# Patient Record
Sex: MALE | Race: WHITE | Employment: OTHER | URBAN - METROPOLITAN AREA
[De-identification: names, ages, dates, MRNs, and addresses within clinical notes are randomized per-mention and may not be internally consistent; named-entity substitution may affect disease eponyms.]

---

## 2017-05-02 ENCOUNTER — TRANSFERRED RECORDS (OUTPATIENT)
Dept: HEALTH INFORMATION MANAGEMENT | Facility: CLINIC | Age: 82
End: 2017-05-02

## 2017-05-04 ENCOUNTER — THERAPY VISIT (OUTPATIENT)
Dept: SPEECH THERAPY | Facility: CLINIC | Age: 82
End: 2017-05-04

## 2017-05-04 DIAGNOSIS — R49.1 APHONIA: Primary | ICD-10-CM

## 2017-05-04 DIAGNOSIS — C32.9 LARYNGEAL CANCER (H): ICD-10-CM

## 2017-05-04 NOTE — PROGRESS NOTES
ALARYNGEAL COMMUNICATION EVALUATION       05/04/17 1300   General Patient Information   Start of Care Date 05/04/17   Referring Physician Dr. Patrick Archuleta   Orders Eval and Treat   Orders Comment TEP eval   Orders Date 05/04/17   Medical Diagnosis Laryngeal cancer s/p total laryngectomy   Onset of illness/injury or Date of Surgery 04/17/12   Special Instructions TEP assessment   Surgical/Medical history reviewed Yes   Pertinent history of current problem PT's PMH is significant for total laryngectomy on 2/28/12 with TEP placement 4/17/12. Pt had recurrent laryngeal cancer s/p XRT. Pt completed XRT for lung cancer in summer of 2015.  He underwent additional XRT for liver mets ending in late 2016.  Pt presents today with leakage through prosthesis central valve.   Hearing WNL for conversational level of speech   Vision Glasses/contacts   Dentition present   Literacy Skills WFL   Present Hydration/Nutrition Method Oral diet   Diet regular with thin liquids   Occupation retired   Living Status house in [place]   Primary Communication Method TEP   Previous Therapy TEP assessments   Evaluation Results   Behavioral Observations Mr. Ma was pleasant and cooperative for evaluation.   Communication Observations Mr. Ma is communicating without difficulty with the use of his TEP and HME.   Postoperative Assessment   Postoperative Assessment Tracheoesophageal Voice Prosthesis;Insertion/Replacement   Tracheoesophageal Voice Prosthesis   Tracheoesophageal Voice Prosthesis Current Prosthesis   Current Prosthesis Intact    Brand Inhealth   Type Indwelling   Length 8mm   Diameter 20 Danish   Date Of Placement 11/07/16   Examination Of Current Prosthesis Well fitting with leakage noted through central valve.   Voicing Function baseline   Swallowing Function aspiration through TEP   Antifungal Strategies nystatin   Reason For Replacement Valve Failure   Insertion/replacement   Insertion/replacement Given The Above  Observations, The Replacement Tracheoesophageal Voice Prosthesis Was Prepared For Insertion According To The  s Instructions.   Brand Inhealth   Type Indwelling   Length 8mm   Diameter 20 Pashto   Insertion Complications none   Voicing Function Following Replacement baseline   Swallowing Function Following Replacement baseline   Hme Brand hi flow; atos   Impressions And Recommendations   Overall Fas Score/level Of Impairment 7   Communication Diagnosis Functional TE voicing   Summary PT presents with a return to functional TE voicing and swallowing without aspiration after prosthesis replacement today.  There were no complications encountered during replacement.    Equipment   Equipment InHealth 20 Fr. 8mm   Plan   Plan for next session Follow up PRN for changes in voicing, swallowing or leakage noted through or around prosthesis.   Education   Learner Patient   Readiness Eager   Method Explanation;Demonstration   Response Demonstrates understanding   Therapy Certification   Certification date from 05/04/17   Certification date to 05/04/17   Medical Diagnosis Laryngeal CA   Other SLP Functional Limitation   Other SLP Functional Limitation: Current Status , Goal , Discharge -Disv Only-Modifier the same for all G-codes CM: 80-99% impairment   Other SLP Functional Limitation: Current & Discharge Modifier Rationale-Eval Only Modifier chosen based on the results of this evaluation, clinical judgement and reference to ERIN guidelines.   Other SLP Functional Limitation Comments Deficits resolved.    Total Evaluation Time   Total Evaluation Time 30

## 2017-05-04 NOTE — MR AVS SNAPSHOT
After Visit Summary   5/4/2017    Grabiel Ma    MRN: [id number]           Patient Information     Date Of Birth          1935        Visit Information        Provider Department      5/4/2017 1:00 PM Maria Luisa Streeter SLP [facility]        Today's Diagnoses     Aphonia    -  1    Laryngeal cancer (H)           Follow-ups after your visit        Additional Services     SPEECH THERAPY REFERRAL       *This therapy referral will be filtered to a centralized scheduling office at [facility] and the patient will receive a call to schedule an appointment at a [place] location most convenient for them. *     [facility] provides Speech Therapy evaluation and treatment and many specialty services across the [place] system.  If requesting a specialty program, please choose from the list below.  If you have not heard from the scheduling office within 2 business days, please call [phone] for all locations, with the exception of [place], please call [phone].       Treatment: Evaluation & Treatment  Speech Treatment Diagnosis: Aphonia  Special Instructions: TEP eval, does not need to be scheduled.   Special Programs: Tracheoesophageal voice prosthesis (TEP)  If the patient is unable to tolerate prosthesis replacement, 2% oral viscous Lidocaine may be administered at dose of 15 mL or 3% Lidocaine trans-stomal spray, 1-6 sprays of 0.1 cc spray       Please be aware that coverage of these services is subject to the terms and limitations of your health insurance plan.  Call member services at your health plan with any benefit or coverage questions.      **Note to Provider:  If you are referring outside of [place] for the therapy appointment, please list the name of the location in the  special instructions  above, print the referral and give to the patient to schedule the appointment.                  Who to contact     Please call your clinic at [phone]  to:    Ask questions about your health    Make or cancel appointments    Discuss your medicines    Learn about your test results    Speak to your doctor   If you have compliments or concerns about an experience at your clinic, or if you wish to file a complaint, please contact [facility] Patient Relations at [phone] or email us at [email or web address]         Additional Information About Your Visit        Coresonichart Information     iRhythm Technologiest is an electronic gateway that provides easy, online access to your medical records. With Mobile Tracing Services, you can request a clinic appointment, read your test results, renew a prescription or communicate with your care team.     To sign up for Mobile Tracing Services visit the website at [email or web address]   You will be asked to enter the access code listed below, as well as some personal information. Please follow the directions to create your username and password.     Your access code is: [email or web address]  Expires: 2017  6:30 AM     Your access code will  in 90 days. If you need help or a new code, please contact your [facility] Clinic or call [phone] for assistance.        Care EveryWhere ID     This is your Care EveryWhere ID. This could be used by other organizations to access your [place] medical records  [id number]         Blood Pressure from Last 3 Encounters:   12 139/69   12 141/83   12 100/48    Weight from Last 3 Encounters:   13 93.4 kg (205 lb 12.8 oz)   12 85 kg (187 lb 6.3 oz)   12 85.8 kg (189 lb 1.6 oz)              We Performed the Following     SPEECH THERAPY REFERRAL        Primary Care Provider Office Phone # Fax #    Alexei Arevalo [phone]       [address] [address]        Thank you!     Thank you for choosing [facility]  for your care. Our goal is always to provide you with excellent care.  Hearing back from our patients is one way we can continue to improve our services. Please take a few minutes to complete the written survey that you may receive in the mail after your visit with us. Thank you!             Your Updated Medication List - Protect others around you: Learn how to safely use, store and throw away your medicines at www.disposemymeds.org.          This list is accurate as of: 5/4/17  1:35 PM.  Always use your most recent med list.                   Brand Name Dispense Instructions for use    amLODIPine 5 MG tablet    NORVASC     Take 5 mg by mouth daily.       furosemide 20 MG tablet    LASIX     Take 20 mg by mouth daily.       levothyroxine 50 MCG tablet    SYNTHROID/LEVOTHROID    90 tablet    Take 1 tablet by mouth daily.       PRILOSEC PO      Take 20 mg by mouth. daily

## 2017-08-08 ENCOUNTER — TRANSFERRED RECORDS (OUTPATIENT)
Dept: HEALTH INFORMATION MANAGEMENT | Facility: CLINIC | Age: 82
End: 2017-08-08

## 2017-09-18 ENCOUNTER — TRANSFERRED RECORDS (OUTPATIENT)
Dept: HEALTH INFORMATION MANAGEMENT | Facility: CLINIC | Age: 82
End: 2017-09-18

## 2017-09-25 ENCOUNTER — TRANSFERRED RECORDS (OUTPATIENT)
Dept: HEALTH INFORMATION MANAGEMENT | Facility: CLINIC | Age: 82
End: 2017-09-25

## 2017-10-10 ENCOUNTER — THERAPY VISIT (OUTPATIENT)
Dept: SPEECH THERAPY | Facility: CLINIC | Age: 82
End: 2017-10-10

## 2017-10-10 DIAGNOSIS — C32.9 LARYNGEAL CANCER (H): Primary | ICD-10-CM

## 2017-10-10 DIAGNOSIS — R49.1 APHONIA: ICD-10-CM

## 2017-10-10 NOTE — MR AVS SNAPSHOT
"              After Visit Summary   10/10/2017    Grabiel Ma    MRN: [id number]           Patient Information     Date Of Birth          1935        Visit Information        Provider Department      10/10/2017 2:00 PM Erika Obando SLP  [facility]        Today's Diagnoses     Laryngeal cancer (H)    -  1    Aphonia           Follow-ups after your visit        Additional Services     SPEECH THERAPY REFERRAL       *This therapy referral will be filtered to a centralized scheduling office at [facility] and the patient will receive a call to schedule an appointment at a [place] location most convenient for them. *     [facility] provides Speech Therapy evaluation and treatment and many specialty services across the [place] system.  If requesting a specialty program, please choose from the list below.  If you have not heard from the scheduling office within 2 business days, please call [phone] for all locations, with the exception of [place], please call [phone].       Treatment: Evaluation & Treatment  Speech Treatment Diagnosis: Fitting and Adjustment of Other Specified Prosthetic Device  Special Instructions: Does not need to be scheduled  Special Programs: Tracheoesophageal voice prosthesis (TEP)  If the patient is unable to tolerate prosthesis replacement, 2% oral viscous Lidocaine may be administered at dose of 15 mL or 3% Lidocaine trans-stomal spray, 1-6 sprays of 0.1 cc spray   4    Please be aware that coverage of these services is subject to the terms and limitations of your health insurance plan.  Call member services at your health plan with any benefit or coverage questions.      **Note to Provider:  If you are referring outside of [place] for the therapy appointment, please list the name of the location in the \"special instructions\" above, print the referral and give to the patient to schedule the appointment.                  Who to " contact     Please call your clinic at [phone] to:    Ask questions about your health    Make or cancel appointments    Discuss your medicines    Learn about your test results    Speak to your doctor   If you have compliments or concerns about an experience at your clinic, or if you wish to file a complaint, please contact [facility] Patient Relations at [phone] or email us at [email or web address]         Additional Information About Your Visit        AppSenseharJelly Button Games Information     GotoTel is an electronic gateway that provides easy, online access to your medical records. With GotoTel, you can request a clinic appointment, read your test results, renew a prescription or communicate with your care team.     To sign up for GotoTel visit the website at [email or web address]   You will be asked to enter the access code listed below, as well as some personal information. Please follow the directions to create your username and password.     Your access code is: [email or web address]  Expires: 2018  2:58 PM     Your access code will  in 90 days. If you need help or a new code, please contact your [facility] Clinic or call [phone] for assistance.        Care EveryWhere ID     This is your Care EveryWhere ID. This could be used by other organizations to access your [place] medical records  [id number]         Blood Pressure from Last 3 Encounters:   12 139/69   12 141/83   12 100/48    Weight from Last 3 Encounters:   13 93.4 kg (205 lb 12.8 oz)   12 85 kg (187 lb 6.3 oz)   12 85.8 kg (189 lb 1.6 oz)              We Performed the Following     SPEECH THERAPY REFERRAL        Primary Care Provider Office Phone # Fax #    Alexei Arevalo [phone]       [address] [address]        Equal Access to Services     ABBI VILLEGAS AH: Tee Tafoya,  waseemabarbara owusu, moises hernandez, lucy zavaletasanam ah. So [facility] [phone].    ATENCIÓN: Si cristianola ursula, tiene a ledezma disposición servicios gratuitos de asistencia lingüística. Llame al [phone].    We comply with applicable federal civil rights laws and [place] laws. We do not discriminate on the basis of race, color, national origin, age, disability, sex, sexual orientation, or gender identity.            Thank you!     [facility] your care. Our goal is always to provide you with excellent care. Hearing back from our patients is one way we can continue to improve our services. Please take a few minutes to complete the written survey that you may receive in the mail after your visit with us. Thank you!             Your Updated Medication List - Protect others around you: Learn how to safely use, store and throw away your medicines at www.disposemymeds.org.          This list is accurate as of: 10/10/17  2:58 PM.  Always use your most recent med list.                   Brand Name Dispense Instructions for use Diagnosis    amLODIPine 5 MG tablet    NORVASC     Take 5 mg by mouth daily.        furosemide 20 MG tablet    LASIX     Take 20 mg by mouth daily.        levothyroxine 50 MCG tablet    SYNTHROID/LEVOTHROID    90 tablet    Take 1 tablet by mouth daily.    Laryngeal cancer (H)       PRILOSEC PO      Take 20 mg by mouth. daily

## 2017-10-23 ENCOUNTER — MEDICAL CORRESPONDENCE (OUTPATIENT)
Dept: HEALTH INFORMATION MANAGEMENT | Facility: CLINIC | Age: 82
End: 2017-10-23

## 2017-10-23 ENCOUNTER — TRANSFERRED RECORDS (OUTPATIENT)
Dept: HEALTH INFORMATION MANAGEMENT | Facility: CLINIC | Age: 82
End: 2017-10-23

## 2017-10-24 ENCOUNTER — DOCUMENTATION ONLY (OUTPATIENT)
Dept: GASTROENTEROLOGY | Facility: CLINIC | Age: 82
End: 2017-10-24

## 2017-10-24 NOTE — PROGRESS NOTES
GI notes or primary provider notes related to GI problem   y    Pathology reports N    Recent Lab  Reports Y    Radiology Reports (CT?MRI) N    Endoscopy Y    Colonoscopy N    Referring GI Physician Name     Referring PCP Name Alexeinika Arevalo [facility]    Notes: Dyspagia    Referral Date 10/24/17    Date Complete Records Received 10/24/17    Date records scanned into epic  10/24/17    Provider Review Date     Date review routed back to Rosa     Letter sent       Notes         ADDENDUM:  Dr. Mckeon reviewed the chart. He does not feel he has anything additional he can offer this patient. Please send letter     Recommendation:    Do not schedule--send letter to patient with other list of GI providers - CC to PCP    Rodolfo Patel MD    [facility]  Division of Gastroenterology, Hepatology and Nutrition

## 2017-10-25 ENCOUNTER — THERAPY VISIT (OUTPATIENT)
Dept: SPEECH THERAPY | Facility: CLINIC | Age: 82
End: 2017-10-25

## 2017-10-25 DIAGNOSIS — C32.9 LARYNGEAL CANCER (H): Primary | ICD-10-CM

## 2017-10-25 DIAGNOSIS — R49.1 APHONIA: ICD-10-CM

## 2017-10-25 NOTE — MR AVS SNAPSHOT
"              After Visit Summary   10/25/2017    Grabiel Ma    MRN: [id number]           Patient Information     Date Of Birth          1935        Visit Information        Provider Department      10/25/2017 10:00 AM Erika Obando SLP  [facility]        Today's Diagnoses     Laryngeal cancer (H)    -  1    Aphonia           Follow-ups after your visit        Additional Services     SPEECH THERAPY REFERRAL       *This therapy referral will be filtered to a centralized scheduling office at [facility] and the patient will receive a call to schedule an appointment at a [place] location most convenient for them. *     [facility] provides Speech Therapy evaluation and treatment and many specialty services across the [place] system.  If requesting a specialty program, please choose from the list below.  If you have not heard from the scheduling office within 2 business days, please call [phone] for all locations, with the exception of [place], please call [phone].       Treatment: Evaluation & Treatment  Speech Treatment Diagnosis: Fitting and Adjustment of Other Specified Prosthetic Device  Special Instructions: Does not need to be scheduled  Special Programs: Tracheoesophageal voice prosthesis (TEP)  If the patient is unable to tolerate prosthesis replacement, 2% oral viscous Lidocaine may be administered at dose of 15 mL or 3% Lidocaine trans-stomal spray, 1-6 sprays of 0.1 cc spray       Please be aware that coverage of these services is subject to the terms and limitations of your health insurance plan.  Call member services at your health plan with any benefit or coverage questions.      **Note to Provider:  If you are referring outside of [place] for the therapy appointment, please list the name of the location in the \"special instructions\" above, print the referral and give to the patient to schedule the appointment.                  Who to " contact     Please call your clinic at [phone] to:    Ask questions about your health    Make or cancel appointments    Discuss your medicines    Learn about your test results    Speak to your doctor   If you have compliments or concerns about an experience at your clinic, or if you wish to file a complaint, please contact [facility] Patient Relations at [phone] or email us at [email or web address] [email or web address]         Additional Information About Your Visit        Gravity JackharSabre Energy Information     Ambarella is an electronic gateway that provides easy, online access to your medical records. With Ambarella, you can request a clinic appointment, read your test results, renew a prescription or communicate with your care team.     To sign up for Ambarella visit the website at [email or web address]   You will be asked to enter the access code listed below, as well as some personal information. Please follow the directions to create your username and password.     Your access code is: [email or web address]  Expires: 2018  2:58 PM     Your access code will  in 90 days. If you need help or a new code, please contact your [facility] Clinic or call [phone] for assistance.        Care EveryWhere ID     This is your Care EveryWhere ID. This could be used by other organizations to access your [place] medical records  [id number]         Blood Pressure from Last 3 Encounters:   12 139/69   12 141/83   12 100/48    Weight from Last 3 Encounters:   13 93.4 kg (205 lb 12.8 oz)   12 85 kg (187 lb 6.3 oz)   12 85.8 kg (189 lb 1.6 oz)              We Performed the Following     SPEECH THERAPY REFERRAL        Primary Care Provider Office Phone # Fax #    Alexei Arevalo [phone]       [address] [address]        Equal Access to Services     ABBI VILLEGAS AH: Tee Tafoya,  waseemabarbara owusu, moises hernandez, lucy zavaletasanam ah. So [facility] [phone].    ATENCIÓN: Si cristianola ursula, tiene a ledezma disposición servicios gratuitos de asistencia lingüística. Llame al [phone].    We comply with applicable federal civil rights laws and [place] laws. We do not discriminate on the basis of race, color, national origin, age, disability, sex, sexual orientation, or gender identity.            Thank you!     [facility] you for choosing [facility]  for your care. Our goal is always to provide you with excellent care. Hearing back from our patients is one way we can continue to improve our services. Please take a few minutes to complete the written survey that you may receive in the mail after your visit with us. Thank you!             Your Updated Medication List - Protect others around you: Learn how to safely use, store and throw away your medicines at www.disposemymeds.org.          This list is accurate as of: 10/25/17 10:58 AM.  Always use your most recent med list.                   Brand Name Dispense Instructions for use Diagnosis    amLODIPine 5 MG tablet    NORVASC     Take 5 mg by mouth daily.        furosemide 20 MG tablet    LASIX     Take 20 mg by mouth daily.        levothyroxine 50 MCG tablet    SYNTHROID/LEVOTHROID    90 tablet    Take 1 tablet by mouth daily.    Laryngeal cancer (H)       PRILOSEC PO      Take 20 mg by mouth. daily

## 2017-10-26 NOTE — PROGRESS NOTES
REFERRAL REVIEW FORM    Referred by: Alexei Arevalo    Reason for referral: dysphagia    Date referral received: 10/24/17    Date records received: 10/24/2017    Date records reviewed: 10/26/2017    Automatic yes:     no    Previous work up:    EGD  GI evaluation - [facility]    Recommendation:    Will ask if Dr. Mckeon will be willing to see the patient as he is referred from [facility] for further evaluation of dysphagia s/p laryngectomy and radiation therapy.    When to schedule:  Pending above    Date patient was contacted regarding scheduling: --    Comments: --

## 2017-11-07 ENCOUNTER — TRANSFERRED RECORDS (OUTPATIENT)
Dept: HEALTH INFORMATION MANAGEMENT | Facility: CLINIC | Age: 82
End: 2017-11-07

## 2017-11-13 ENCOUNTER — TRANSFERRED RECORDS (OUTPATIENT)
Dept: HEALTH INFORMATION MANAGEMENT | Facility: CLINIC | Age: 82
End: 2017-11-13

## 2017-11-14 ENCOUNTER — TRANSFERRED RECORDS (OUTPATIENT)
Dept: HEALTH INFORMATION MANAGEMENT | Facility: CLINIC | Age: 82
End: 2017-11-14

## 2018-02-06 ENCOUNTER — TRANSFERRED RECORDS (OUTPATIENT)
Dept: HEALTH INFORMATION MANAGEMENT | Facility: CLINIC | Age: 83
End: 2018-02-06

## 2018-03-28 ENCOUNTER — THERAPY VISIT (OUTPATIENT)
Dept: SPEECH THERAPY | Facility: CLINIC | Age: 83
End: 2018-03-28
Payer: MEDICARE

## 2018-03-28 DIAGNOSIS — R49.1 APHONIA: ICD-10-CM

## 2018-03-28 DIAGNOSIS — C32.9 LARYNGEAL CANCER (H): Primary | ICD-10-CM

## 2018-03-28 NOTE — PROGRESS NOTES
03/28/18 1200   General Patient Information   Start of Care Date 03/28/18   Referring Physician Dr. Patrick Archuleta   Orders Eval and Treat   Orders Comment TEP Eval   Orders Date 03/28/18   Medical Diagnosis Laryngeal cancer s/p total laryngectomy   Onset of illness/injury or Date of Surgery 04/17/12   Precautions/Limitations no known precautions/limitations   Special Instructions TEP assessment   Surgical/Medical history reviewed Yes   Pertinent history of current problem PT's PMH is significant for total laryngectomy on 2/28/12 with TEP placement 4/17/12. Pt had recurrent laryngeal cancer s/p XRT. Pt completed XRT for lung cancer in summer of 2015. He underwent additional XRT for liver mets ending in late 2016. Pt presents today with leakage through central valve for the past 5 days.    Hearing WNL for conversational level of speech   Vision Glasses/contacts   Dentition present   Literacy Skills WFL   Present Hydration/Nutrition Method Oral diet   Diet regular with thin liquids   Occupation retired   Living Status house in [place]   Primary Communication Method TEP   Previous Therapy TEP assessments   Evaluation Results   Behavioral Observations Mr. Ma was pleasant and cooperative for evaluation.   Communication Observations Mr. Ma is communicating without difficulty with the use of his TEP and HME.   Postoperative Assessment   Postoperative Assessment Tracheoesophageal Voice Prosthesis;Insertion/Replacement   Tracheoesophageal Voice Prosthesis   Tracheoesophageal Voice Prosthesis Current Prosthesis   Current Prosthesis Intact    Brand Atos  (Lane)   Type Indwelling   Length 6mm   Diameter 20 Maori   Date Of Placement 10/25/17   Examination Of Current Prosthesis Intact in tract. Leaking through central valve.   Voicing Function baseline   Swallowing Function Functional   Antifungal Strategies nystatin   Reason For Replacement Valve Failure   Insertion/replacement   Insertion/replacement Given The  Above Observations, The Replacement Tracheoesophageal Voice Prosthesis Was Prepared For Insertion According To The  s Instructions.   Brand Inhealth   Type Indwelling   Length 6mm   Diameter 20 Slovak   Insertion Complications None   Voicing Function Following Replacement baseline   Swallowing Function Following Replacement baseline   Hme Brand hi flow; atos   Impressions And Recommendations   Overall Fas Score/level Of Impairment 7   Communication Diagnosis Functional TE voicing   Summary Pt returned to functional voicing and swallowing without leakage after change. His TEP was well fitting intract prior to and following change. Pt to return for evaluation if leakage or changes in voicing develop.   Equipment   Equipment InHealth 20 Fr 6mm    Plan   Plan for next session Follow up PRN for changes in voicing, swallowing or leakage noted through or around prosthesis.   Education   Learner Patient   Readiness Eager   Method Explanation;Demonstration   Response Demonstrates understanding   Therapy Certification   Medical Diagnosis Laryngeal CA   Other SLP Functional Limitation   Other SLP Functional Limitation: Current Status , Goal , Discharge -Lufe Only-Modifier the same for all G-codes CM: 80-99% impairment   Other SLP Functional Limitation: Current & Discharge Modifier Rationale-Eval Only Modifier chosen based on the results of this evaluation, clinical judgement and reference to ERIN guidelines. Pt goal and discharge status are CI: 1-19% impairment   Other SLP Functional Limitation Comments Deficits resolved.    Total Evaluation Time   Total Evaluation Time 30

## 2018-03-28 NOTE — MR AVS SNAPSHOT
"              After Visit Summary   3/28/2018    Grabiel Ma    MRN: [id number]           Patient Information     Date Of Birth          1935        Visit Information        Provider Department      3/28/2018 12:00 PM Erika Obando SLP [facility]        Today's Diagnoses     Laryngeal cancer (H)    -  1    Aphonia           Follow-ups after your visit        Additional Services     SPEECH THERAPY REFERRAL       *This therapy referral will be filtered to a centralized scheduling office at [facility] and the patient will receive a call to schedule an appointment at a [place] location most convenient for them. *     [facility] provides Speech Therapy evaluation and treatment and many specialty services across the [facility].  If requesting a specialty program, please choose from the list below.  If you have not heard from the scheduling office within 2 business days, please call [phone] for all locations, with the exception of [place], please call [phone] and [facility], please call [phone]      Treatment: Evaluation & Treatment  Speech Treatment Diagnosis: Fitting and Adjustment of Other Specified Prosthetic Device  Special Instructions: Does not need to be scheduled  Special Programs: Tracheoesophageal voice prosthesis (TEP)  If the patient is unable to tolerate prosthesis replacement, 2% oral viscous Lidocaine may be administered at dose of 15 mL or 3% Lidocaine trans-stomal spray, 1-6 sprays of 0.1 cc spray       Please be aware that coverage of these services is subject to the terms and limitations of your health insurance plan.  Call member services at your health plan with any benefit or coverage questions.      **Note to Provider:  If you are referring outside of [place] for the therapy appointment, please list the name of the location in the \"special instructions\" above, print the referral and give to the patient to schedule the " appointment.                  Who to contact     Please call your clinic at [phone] to:    Ask questions about your health    Make or cancel appointments    Discuss your medicines    Learn about your test results    Speak to your doctor            Additional Information About Your Visit        MyChart Information     Spottly is an electronic gateway that provides easy, online access to your medical records. With Spottly, you can request a clinic appointment, read your test results, renew a prescription or communicate with your care team.     To sign up for Spottly visit the website at [email or web address]   You will be asked to enter the access code listed below, as well as some personal information. Please follow the directions to create your username and password.     Your access code is: [email or web address]  Expires: 2018 12:26 PM     Your access code will  in 90 days. If you need help or a new code, please contact your [facility] or call [phone] for assistance.        Care EveryWhere ID     This is your Care EveryWhere ID. This could be used by other organizations to access your [place] medical records  [id number]         Blood Pressure from Last 3 Encounters:   12 139/69   12 141/83   12 100/48    Weight from Last 3 Encounters:   13 93.4 kg (205 lb 12.8 oz)   12 85 kg (187 lb 6.3 oz)   12 85.8 kg (189 lb 1.6 oz)              We Performed the Following     SPEECH THERAPY REFERRAL        Primary Care Provider Office Phone # Fax #    Alexei Arevalo [phone]       [address] [address]        Equal Access to Services     [facility] : Hadii kayleigh camara Sosamir, waaxda luqadaha, qaybta kaalmada adezayyada, lucy obregon . So [facility] [phone].    ATENCIÓN: Si habla español, tiene a ledezma disposición servicios gratuitos de asistencia  lingüísticaDanny Gilbert al [phone].    We comply with applicable federal civil rights laws and [place] laws. We do not discriminate on the basis of race, color, national origin, age, disability, sex, sexual orientation, or gender identity.            Thank you!     Thank you for choosing [facility]  for your care. Our goal is always to provide you with excellent care. Hearing back from our patients is one way we can continue to improve our services. Please take a few minutes to complete the written survey that you may receive in the mail after your visit with us. Thank you!             Your Updated Medication List - Protect others around you: Learn how to safely use, store and throw away your medicines at www.disposemymeds.org.          This list is accurate as of 3/28/18 12:26 PM.  Always use your most recent med list.                   Brand Name Dispense Instructions for use Diagnosis    amLODIPine 5 MG tablet    NORVASC     Take 5 mg by mouth daily.        furosemide 20 MG tablet    LASIX     Take 20 mg by mouth daily.        levothyroxine 50 MCG tablet    SYNTHROID/LEVOTHROID    90 tablet    Take 1 tablet by mouth daily.    Laryngeal cancer (H)       PRILOSEC PO      Take 20 mg by mouth. daily

## 2018-05-08 ENCOUNTER — TRANSFERRED RECORDS (OUTPATIENT)
Dept: HEALTH INFORMATION MANAGEMENT | Facility: CLINIC | Age: 83
End: 2018-05-08

## 2018-05-29 ENCOUNTER — TRANSFERRED RECORDS (OUTPATIENT)
Dept: HEALTH INFORMATION MANAGEMENT | Facility: CLINIC | Age: 83
End: 2018-05-29

## 2018-06-23 ENCOUNTER — TRANSFERRED RECORDS (OUTPATIENT)
Dept: HEALTH INFORMATION MANAGEMENT | Facility: CLINIC | Age: 83
End: 2018-06-23

## 2018-07-02 ENCOUNTER — THERAPY VISIT (OUTPATIENT)
Dept: SPEECH THERAPY | Facility: CLINIC | Age: 83
End: 2018-07-02
Payer: MEDICARE

## 2018-07-02 DIAGNOSIS — R49.1 APHONIA: ICD-10-CM

## 2018-07-02 DIAGNOSIS — C32.9 LARYNGEAL CANCER (H): Primary | ICD-10-CM

## 2018-07-02 NOTE — PROGRESS NOTES
07/02/18 1230   General Patient Information   Start of Care Date 07/02/18   Referring Physician Dr. Patrick Archuleta   Orders Eval and Treat   Orders Comment TEP Eval    Orders Date 07/02/18   Medical Diagnosis Laryngeal cancer s/p total laryngectomy   Onset of illness/injury or Date of Surgery 04/17/12   Precautions/Limitations no known precautions/limitations   Special Instructions TEP assessment   Surgical/Medical history reviewed Yes   Pertinent history of current problem PT's PMH is significant for total laryngectomy on 2/28/12 with TEP placement 4/17/12. Pt had recurrent laryngeal cancer s/p XRT. Pt completed XRT for lung cancer in summer of 2015. He underwent additional XRT for liver mets ending in late 2016. Pt reports he has been hospitalized recently and participate in video swallow study. He reported leakage through his prosthesis and this may have contributed to his pneumonia. He currently is on antibiotics   Hearing WNL for conversational level of speech   Vision Glasses/contacts   Dentition present   Literacy Skills WFL   Present Hydration/Nutrition Method Oral diet   Diet regular with thin liquids   Occupation retired   Living Status house in [place]   Primary Communication Method TEP   Previous Therapy TEP assessments   Evaluation Results   Behavioral Observations Mr. Ma was pleasant and cooperative for evaluation.   Communication Observations Mr. Ma is communicating without difficulty with the use of his TEP and HME.   Postoperative Assessment   Postoperative Assessment Tracheoesophageal Voice Prosthesis;Insertion/Replacement   Tracheoesophageal Voice Prosthesis   Tracheoesophageal Voice Prosthesis Current Prosthesis   Current Prosthesis Intact    Brand Atos  (Lane)   Type Indwelling   Length 6mm   Diameter 20 Equ[place] Guinean   Date Of Placement 03/28/18   Examination Of Current Prosthesis Intact in tract. Slightly long. Leaking through central valve.   Voicing Function baseline   Swallowing  Function Functional   Antifungal Strategies nystatin   Reason For Replacement Valve Failure   Insertion/replacement   Insertion/replacement Given The Above Observations, The Replacement Tracheoesophageal Voice Prosthesis Was Prepared For Insertion According To The  s Instructions.   Brand Inhealth   Type Indwelling   Length 6mm   Diameter 20 Palestinian   Insertion Complications None   Voicing Function Following Replacement baseline   Swallowing Function Following Replacement baseline   Hme Brand hi flow; atos   Impressions And Recommendations   Overall Fas Score/level Of Impairment 7   Communication Diagnosis Functional TE voicing   Summary Pt returned to functional voicing and swallowing without leakage after change. His TEP was well fitting intract prior to and following change. Pt to return for evaluation if leakage or changes in voicing develop.   Equipment   Equipment InHealth 20 Fr 6mm    Plan   Plan for next session Follow up PRN for changes in voicing, swallowing or leakage noted through or around prosthesis.   Education   Learner Patient   Readiness Eager   Method Explanation;Demonstration   Response Demonstrates understanding   Therapy Certification   Medical Diagnosis Laryngeal CA   Other SLP Functional Limitation   Other SLP Functional Limitation: Current Status , Goal , Discharge -Xkoq Only-Modifier the same for all G-codes CM: 80-99% impairment   Other SLP Functional Limitation: Current & Discharge Modifier Rationale-Eval Only Modifier chosen based on the results of this evaluation, clinical judgement and reference to ERIN guidelines. Pt goal and discharge status are CI: 1-19% impairment   Other SLP Functional Limitation Comments Deficits resolved.    Total Evaluation Time   Total Evaluation Time 30

## 2018-07-02 NOTE — MR AVS SNAPSHOT
"              After Visit Summary   7/2/2018    Grabiel Ma    MRN: [id number]           Patient Information     Date Of Birth          1935        Visit Information        Provider Department      7/2/2018 12:30 PM E[facility] Obando SLP  [facility]        Today's Diagnoses     Laryngeal cancer (H)    -  1    Aphonia           Follow-ups after your visit        Additional Services     SPEECH THERAPY REFERRAL       *This therapy referral will be filtered to a centralized scheduling office at [facility] and the patient will receive a call to schedule an appointment at a [place] location most convenient for them. *     [facility] provides Speech Therapy evaluation and treatment and many specialty services across the [facility].  If requesting a specialty program, please choose from the list below.  If you have not heard from the scheduling office within 2 business days, please call [phone] for all locations, with the exception of [place], please call [phone] and [facility], please call [phone]      Treatment: Evaluation & Treatment  Speech Treatment Diagnosis: Fitting and Adjustment of Other Specified Prosthetic Device  Special Instructions: Does not need to be scheduled  Special Programs: Tracheoesophageal voice prosthesis (TEP)  If the patient is unable to tolerate prosthesis replacement, 2% oral viscous Lidocaine may be administered at dose of 15 mL or 3% Lidocaine trans-stomal spray, 1-6 sprays of 0.1 cc spray       Please be aware that coverage of these services is subject to the terms and limitations of your health insurance plan.  Call member services at your health plan with any benefit or coverage questions.      **Note to Provider:  If you are referring outside of [place] for the therapy appointment, please list the name of the location in the \"special instructions\" above, print the referral and give to the patient to schedule the " appointment.                  Who to contact     Please call your clinic at [phone] to:    Ask questions about your health    Make or cancel appointments    Discuss your medicines    Learn about your test results    Speak to your doctor            Additional Information About Your Visit        MyChart Information     Senzari is an electronic gateway that provides easy, online access to your medical records. With Senzari, you can request a clinic appointment, read your test results, renew a prescription or communicate with your care team.     To sign up for Senzari visit the website at [email or web address]   You will be asked to enter the access code listed below, as well as some personal information. Please follow the directions to create your username and password.     Your access code is: [email or web address]  Expires: 2018 12:50 PM     Your access code will  in 90 days. If you need help or a new code, please contact your [facility] or call [phone] for assistance.        Care EveryWhere ID     This is your Care EveryWhere ID. This could be used by other organizations to access your [place] medical records  [id number]         Blood Pressure from Last 3 Encounters:   12 139/69   12 141/83   12 100/48    Weight from Last 3 Encounters:   13 93.4 kg (205 lb 12.8 oz)   12 85 kg (187 lb 6.3 oz)   12 85.8 kg (189 lb 1.6 oz)              We Performed the Following     SPEECH THERAPY REFERRAL        Primary Care Provider Office Phone # Fax #    Alexei Arevalo [phone]       [address] [address]        Equal Access to Services     [facility]: Hadii aad inez camara Sosamir, waaxda luqadaha, qaybta kaalmada adeegyada, lucy obregon . So [facility] [phone].    ATENCIÓN: Si habla español, tiene a ledezma disposición servicios gratuitos de asistencia  lingüística. Vladimir al [phone].    We comply with applicable federal civil rights laws and [place] laws. We do not discriminate on the basis of race, color, national origin, age, disability, sex, sexual orientation, or gender identity.            Thank you!     Thank you for choosing [facility]  for your care. Our goal is always to provide you with excellent care. Hearing back from our patients is one way we can continue to improve our services. Please take a few minutes to complete the written survey that you may receive in the mail after your visit with us. Thank you!             Your Updated Medication List - Protect others around you: Learn how to safely use, store and throw away your medicines at www.disposemymeds.org.          This list is accurate as of 7/2/18 12:50 PM.  Always use your most recent med list.                   Brand Name Dispense Instructions for use Diagnosis    amLODIPine 5 MG tablet    NORVASC     Take 5 mg by mouth daily.        furosemide 20 MG tablet    LASIX     Take 20 mg by mouth daily.        levothyroxine 50 MCG tablet    SYNTHROID/LEVOTHROID    90 tablet    Take 1 tablet by mouth daily.    Laryngeal cancer (H)       PRILOSEC PO      Take 20 mg by mouth. daily

## 2018-07-25 ENCOUNTER — TRANSFERRED RECORDS (OUTPATIENT)
Dept: HEALTH INFORMATION MANAGEMENT | Facility: CLINIC | Age: 83
End: 2018-07-25

## 2018-09-13 ENCOUNTER — TRANSFERRED RECORDS (OUTPATIENT)
Dept: HEALTH INFORMATION MANAGEMENT | Facility: CLINIC | Age: 83
End: 2018-09-13

## 2018-09-18 ENCOUNTER — TRANSFERRED RECORDS (OUTPATIENT)
Dept: HEALTH INFORMATION MANAGEMENT | Facility: CLINIC | Age: 83
End: 2018-09-18

## 2018-09-19 ENCOUNTER — TRANSFERRED RECORDS (OUTPATIENT)
Dept: HEALTH INFORMATION MANAGEMENT | Facility: CLINIC | Age: 83
End: 2018-09-19

## 2018-10-02 ENCOUNTER — TRANSFERRED RECORDS (OUTPATIENT)
Dept: HEALTH INFORMATION MANAGEMENT | Facility: CLINIC | Age: 83
End: 2018-10-02

## 2018-10-04 ENCOUNTER — THERAPY VISIT (OUTPATIENT)
Dept: SPEECH THERAPY | Facility: CLINIC | Age: 83
End: 2018-10-04
Payer: MEDICARE

## 2018-10-04 DIAGNOSIS — R49.1 APHONIA: ICD-10-CM

## 2018-10-04 DIAGNOSIS — C32.9 LARYNGEAL CANCER (H): Primary | ICD-10-CM

## 2018-10-04 NOTE — PROGRESS NOTES
10/04/18 1300   General Patient Information   Start of Care Date 10/04/18   Referring Physician Dr. Patrick Archuleta   Orders Eval and Treat   Orders Comment TEP eval   Orders Date 10/04/18   Medical Diagnosis Laryngeal cancer s/p total laryngectomy   Onset of illness/injury or Date of Surgery 04/17/12   Precautions/Limitations no known precautions/limitations   Special Instructions TEP assessment   Surgical/Medical history reviewed Yes   Pertinent history of current problem PT's PMH is significant for total laryngectomy on 2/28/12 with TEP placement 4/17/12. Pt had recurrent laryngeal cancer s/p XRT. Pt completed XRT for lung cancer in summer of 2015. He underwent additional XRT for liver mets ending in late 2016. Pt reports leakage which begain on Sunday. He was not able to get in until today. He has had pneumonia several times since his last TEP change with multiple courses of antibiotics. He reports a video swallow study but this was done elsewhere so images are not available. He would like to follow up with Dr. Archuleta because his swallowing has gotten worse over time as well.    Hearing WNL for conversational level of speech   Vision Glasses/contacts   Dentition present   Literacy Skills WFL   Present Hydration/Nutrition Method Oral diet   Diet regular with thin liquids   Occupation retired   Living Status house in [place]   Primary Communication Method TEP   Previous Therapy TEP assessments   Evaluation Results   Behavioral Observations Mr. Ma was pleasant and cooperative for evaluation.   Communication Observations Mr. Ma is communicating without difficulty with the use of his TEP and HME.   Postoperative Assessment   Postoperative Assessment Tracheoesophageal Voice Prosthesis;Insertion/Replacement   Tracheoesophageal Voice Prosthesis   Tracheoesophageal Voice Prosthesis Current Prosthesis   Current Prosthesis Intact    Brand Atos  (Lane)   Type Indwelling   Length 6mm   Diameter 20 Tristanian    Date Of Placement 07/02/18   Examination Of Current Prosthesis Intact in tract. Leaking through central valve   Voicing Function baseline   Swallowing Function Functional   Antifungal Strategies nystatin   Reason For Replacement Valve Failure   Insertion/replacement   Insertion/replacement Given The Above Observations, The Replacement Tracheoesophageal Voice Prosthesis Was Prepared For Insertion According To The  s Instructions.   Brand Inhealth   Type Indwelling   Length 6mm   Diameter 20 Amharic   Insertion Complications None   Voicing Function Following Replacement baseline   Swallowing Function Following Replacement baseline   Hme Brand hi flow; atos   Impressions And Recommendations   Overall Fas Score/level Of Impairment 7   Communication Diagnosis Functional TE voicing   Summary Pt returned to functional voicing and swallowing without leakage after change. His TEP was well fitting intract prior to and following change. InHealth 20 Fr 6mm prosthesis placed without incident. Pt to return for evaluation if leakage or changes in voicing develop. Pt will continue use of baseplates and HME's for pulmonary rehab following laryngectomy. He may also benefit from follow up with Dr. Archuleta for changes in swallowing and repeated pneumonia.    Equipment   Equipment InHealth 20 Fr 6mm    Plan   Plan for next session Follow up PRN for changes in voicing, swallowing or leakage noted through or around prosthesis.   Education   Learner Patient   Readiness Eager   Method Explanation;Demonstration   Response Demonstrates understanding   Therapy Certification   Medical Diagnosis Laryngeal CA   Other SLP Functional Limitation   Other SLP Functional Limitation: Current Status , Goal , Discharge -Camb Only-Modifier the same for all G-codes CM: 80-99% impairment   Other SLP Functional Limitation: Current & Discharge Modifier Rationale-Eval Only Modifier chosen based on the results of this evaluation,  clinical judgement and reference to ERIN guidelines. Pt goal and discharge status are CI: 1-19% impairment   Other SLP Functional Limitation Comments Deficits resolved.    Total Evaluation Time   Total Evaluation Time 30

## 2018-10-04 NOTE — MR AVS SNAPSHOT
After Visit Summary   10/4/2018    Grabiel Ma    MRN: [id number]           Patient Information     Date Of Birth          1935        Visit Information        Provider Department      10/4/2018 1:00 PM Erika Obando SLP [facility]        Today's Diagnoses     Laryngeal cancer (H)    -  1    [place]           Follow-ups after your visit        Additional Services     SPEECH THERAPY REFERRAL       If you have not heard from the scheduling office within 2 business days, please call [phone] for all locations, with the exception of [place], please call [phone] and [place], please call [phone].    Please be aware that coverage of these services is subject to the terms and limitations of your health insurance plan.  Call member services at your health plan with any benefit or coverage questions.                  Future tests that were ordered for you today     Open Future Orders        Priority Expected Expires Ordered    SPEECH THERAPY REFERRAL Routine  10/4/2019 10/4/2018            Who to contact     Please call your clinic at [phone] to:    Ask questions about your health    Make or cancel appointments    Discuss your medicines    Learn about your test results    Speak to your doctor            Additional Information About Your Visit        MyChart Information     [employer] is an electronic gateway that provides easy, online access to your medical records. With [employer], you can request a clinic appointment, read your test results, renew a prescription or communicate with your care team.     To sign up for [employer] visit the website at [email or web address]   You will be asked to enter the access code listed below, as well as some personal information. Please follow the directions to create your username and password.     Your access code is: [email or web address]  Expires: 2019  1:43 PM     Your access code will  in 90 days. If you need help or a new code, please  contact your [facility] or call [phone] for assistance.        Care EveryWhere ID     This is your Care EveryWhere ID. This could be used by other organizations to access your [facility] medical records  [id number]         Blood Pressure from Last 3 Encounters:   04/17/12 139/69   03/13/12 141/83   03/06/12 100/48    Weight from Last 3 Encounters:   03/25/13 93.4 kg (205 lb 12.8 oz)   04/17/12 85 kg (187 lb 6.3 oz)   04/02/12 85.8 kg (189 lb 1.6 oz)               Primary Care Provider Office Phone # Fax #    Alexei Arevalo [phone]       [place] [id number] [facility] [address]        Equal Access to Services     ABBI VILLEGAS : Hadii kayliegh wiley hadasho Soomaali, waaxda luqadaha, qaybta kaalmada adeegyada, lucy obregon . So [facility] [phone].    ATENCIÓN: Si habla español, tiene a ledezma disposición servicios gratuitos de asistencia lingüística. Vladimir al [phone].    We comply with applicable federal civil rights laws and [place] laws. We do not discriminate on the basis of race, color, national origin, age, disability, sex, sexual orientation, or gender identity.            Thank you!     Thank you for choosing [facility]  for your care. Our goal is always to provide you with excellent care. Hearing back from our patients is one way we can continue to improve our services. Please take a few minutes to complete the written survey that you may receive in the mail after your visit with us. Thank you!             Your Updated Medication List - Protect others around you: Learn how to safely use, store and throw away your medicines at www.disposemymeds.org.          This list is accurate as of 10/4/18  1:43 PM.  Always use your most recent med list.                   Brand Name Dispense Instructions for use Diagnosis    amLODIPine 5 MG tablet    NORVASC     Take 5 mg by mouth daily.        furosemide 20 MG tablet     LASIX     Take 20 mg by mouth daily.        levothyroxine 50 MCG tablet    SYNTHROID/LEVOTHROID    90 tablet    Take 1 tablet by mouth daily.    Laryngeal cancer (H)       PRILOSEC PO      Take 20 mg by mouth. daily

## 2018-10-08 ENCOUNTER — TELEPHONE (OUTPATIENT)
Dept: OTOLARYNGOLOGY | Facility: CLINIC | Age: 83
End: 2018-10-08

## 2018-10-08 ENCOUNTER — TRANSFERRED RECORDS (OUTPATIENT)
Dept: HEALTH INFORMATION MANAGEMENT | Facility: CLINIC | Age: 83
End: 2018-10-08

## 2018-10-17 NOTE — TELEPHONE ENCOUNTER
"FUTURE VISIT INFORMATION      FUTURE VISIT INFORMATION:    Date: 10/22/18    Time: 9:30AM    Location: [facility]  REFERRAL INFORMATION:    Referring provider:  Erika Obando SLP    Referring providers clinic:  [facility]    Reason for visit/diagnosis  patient reports having frequent \"aspiration pneumonia\", last seen 2013    RECORDS REQUESTED FROM:       Clinic name Comments Records Status Imaging Status   [facility] 10/4/18 notes for TEP Eval  EPIC    [facility] 3/25/13 ENT follow up with Dr Archuleta w/ CT Chest [facility]   8/15/18-8/17/18 Hospital Encounter with Dr Watt [facility] Care Everywhere    [facility] 10/8/18 notes with Dr Jefferson Sent to scan     [facility] 9/12/18 & 7/23/18 CT Chest  6/23/18 video swallow  11/14/17 PET  Sent to [facility] 9/18/18 notes with Dr Carlos Manuel Lou  9/25/18  Notes with audiogram  Sent to scan    [facility]  10/2/18 notes with Dr Curtis Cobian Sent to scan                        RECORDS STATUS      10/17/18 10:19AM called over to [facility] to request records  : (fax) [phone] (or) [phone], need reports for images above and additional records via RightFax- Amay    10/17/18 2:03PM called [facility] to fax over reports of images/tests requested above, reports will be faxed shortly - Amay  "

## 2018-10-22 ENCOUNTER — PRE VISIT (OUTPATIENT)
Dept: OTOLARYNGOLOGY | Facility: CLINIC | Age: 83
End: 2018-10-22

## 2018-10-22 ENCOUNTER — OFFICE VISIT (OUTPATIENT)
Dept: OTOLARYNGOLOGY | Facility: CLINIC | Age: 83
End: 2018-10-22
Payer: MEDICARE

## 2018-10-22 VITALS — BODY MASS INDEX: 21.94 KG/M2 | WEIGHT: 171 LBS | HEIGHT: 74 IN

## 2018-10-22 DIAGNOSIS — R13.12 OROPHARYNGEAL DYSPHAGIA: Primary | ICD-10-CM

## 2018-10-22 RX ORDER — NIFEDIPINE 30 MG
30 TABLET, EXTENDED RELEASE ORAL
COMMUNITY
Start: 2018-08-08

## 2018-10-22 RX ORDER — METOPROLOL TARTRATE 25 MG/1
TABLET, FILM COATED ORAL
Refills: 3 | COMMUNITY
Start: 2018-10-13

## 2018-10-22 RX ORDER — DOXYCYCLINE 100 MG/1
CAPSULE ORAL
Refills: 0 | COMMUNITY
Start: 2018-10-19

## 2018-10-22 RX ORDER — ASPIRIN 81 MG/1
81 TABLET ORAL
COMMUNITY

## 2018-10-22 ASSESSMENT — PAIN SCALES - GENERAL: PAINLEVEL: NO PAIN (0)

## 2018-10-22 NOTE — NURSING NOTE
Chief Complaint   Patient presents with     Consult     'aspiration pneumonia'     Navdeep Galvin, EMT

## 2018-10-22 NOTE — LETTER
10/22/2018       RE: Grabiel Ma  [address] [address]     Dear Colleague,    Thank you for referring your patient, Grabiel Ma, to the [facility] E[facility]SE AND THROAT at [facility]. Please see a copy of my visit note below.    HISTORY OF PRESENT ILLNESS: Mr. Ma returns for follow up at the request of Erika Obando, SLP, regarding recurrent aspiration pneumonia episodes.  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  He follows with [facility] [place] oncology group.  He does have metastatic non-small cell lung carcinoma with liver metastases status post radiotherapy in 2015.  He is now receiving immunotherapy for this- Erbitux.     Started having frequent pneumonia episodes since February this year.  He will have 1 every month or so for which he requires either admission with IV antibiotics or outpatient antibiotics.  His most recent pneumonia was diagnosed last Friday for which he is taking doxycycline.  He denies feeling any choking episodes or that his TEP is leaking.  He has not had any recent fevers or chills.  He has noticed significant issues swallowing food.  Will experience regurgitation even hours after he has eaten.  This has been progressively getting worse over the past year or so.  Per patient, he had an upper GI endoscopy and a swallow study done at an outside facility, but we do not have those records available.     PHYSICAL EXAM: This is a well-appearing gentleman in no acute distress.  He weighs 171 pounds, he states this is down about 20 pounds the last year or so.  Examination of the ears reveals normal auricles, external auditory canals, and tympanic membranes are normal bilaterally.  Anterior rhinoscopy reveals no normal mucosa, no polyps or mucopurulence or masses.  Examination of the oral  cavity reveals normal mucosa of the tongue, floor of mouth, hard and soft palate, gingival and buccal mucosa, retromolar trigone, posterior pharyngeal wall.  Mirror exam reveals a healthy nasopharynx.  Examination of the stoma reveals healthy tissue and well-positioned TEP.      IMPRESSION: Dysphagia likely related to late effects of radiotherapy.    PLAN:   Obtain swallow study. He could possibly benefit for esophageal dilation in the operating room. Once complete we will determine the next steps.    I, Patrick Archuleta, saw this patient with the resident/fellow and agree with the resident's findings and plan of care as documented in the resident's/fellow's note.      Patrick Archuleta MD

## 2018-10-22 NOTE — PROGRESS NOTES
HISTORY OF PRESENT ILLNESS: Mr. Ma returns for follow up at the request of Erika Obando, BAR, regarding recurrent aspiration pneumonia episodes.  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  He follows with [facility] oncology group.  He does have metastatic non-small cell lung carcinoma with liver metastases status post radiotherapy in 2015.  He is now receiving immunotherapy for this- Erbitux.     Started having frequent pneumonia episodes since February this year.  He will have 1 every month or so for which he requires either admission with IV antibiotics or outpatient antibiotics.  His most recent pneumonia was diagnosed last Friday for which he is taking doxycycline.  He denies feeling any choking episodes or that his TEP is leaking.  He has not had any recent fevers or chills.  He has noticed significant issues swallowing food.  Will experience regurgitation even hours after he has eaten.  This has been progressively getting worse over the past year or so.  Per patient, he had an upper GI endoscopy and a swallow study done at an outside facility, but we do not have those records available.     PHYSICAL EXAM: This is a well-appearing gentleman in no acute distress.  He weighs 171 pounds, he states this is down about 20 pounds the last year or so.  Examination of the ears reveals normal auricles, external auditory canals, and tympanic membranes are normal bilaterally.  Anterior rhinoscopy reveals no normal mucosa, no polyps or mucopurulence or masses.  Examination of the oral cavity reveals normal mucosa of the tongue, floor of mouth, hard and soft palate, gingival and buccal mucosa, retromolar trigone, posterior pharyngeal wall.  Mirror exam reveals a healthy nasopharynx.  Examination of the stoma reveals healthy tissue and well-positioned  TEP.      IMPRESSION: Dysphagia likely related to late effects of radiotherapy.    PLAN:   Obtain swallow study. He could possibly benefit for esophageal dilation in the operating room. Once complete we will determine the next steps.    I, Patrick Archuleta, saw this patient with the resident/fellow and agree with the resident's findings and plan of care as documented in the resident's/fellow's note.

## 2018-10-22 NOTE — MR AVS SNAPSHOT
After Visit Summary   10/22/2018    Grabiel Ma    MRN: [id number]           Patient Information     Date Of Birth          1935        Visit Information        Provider Department      10/22/2018 9:30 AM Patrick Archuleta MD [facility] Ear Nose and Throat        Today's Diagnoses     Dysphagia    -  1      Care Instructions    1. Please schedule a swallow study.   2. Please call the ENT clinic with any questions,concerns, new or worsening symptoms.    -Clinic number is [phone]   - Sophie's direct line (Dr. Archuleta's nurse) [phone]              Follow-ups after your visit        Additional Services     Speech Therapy Referral       If you have not heard from the scheduling office within 2 business days, please call [phone] for all locations, with the exception of [place], please call [phone] and [place], please call [phone].    Please be aware that coverage of these services is subject to the terms and limitations of your health insurance plan.  Call member services at your health plan with any benefit or coverage questions.                  Your next 10 appointments already scheduled     Nov 01, 2018 10:00 AM CDT   Video Swallow with BAR Guzman   [facility] ([facility])    [address] [place] [address]  4th [facility] [address] [phone]           Please check in for this visit in Imaging on the 1st floor.            Nov 01, 2018 10:00 AM CDT   XR VIDEO SPEECH EVALUATION WITH ESOPHAGRAM with UCXR2, THIEN DUKESU RAD   [facility] X[facility] ([facility])    [address] [place] [address]  1st [facility] [address] [phone]           How do I prepare for my exam? (Food and drink instructions) Do not eat for 4 hours before the exam. Keep drinking clear liquids until 2 hours before the exam.  How do I prepare for my exam? (Other instructions) You may take pain medicine (with a sip of water)  up to 4 hours before the exam. Do not swallow any other medicines unless your doctor tells you to. Talk to your doctor to be sure it s safe to stop your medicines.  What should I wear: Wear comfortable clothes.  How long does the exam take: The exam usually takes about 30-45 minutes.  What should I bring: Bring a list of your current medicines to your exam. (Include vitamins, minerals and over-the-counter medicines.) Leave your valuables at home. Do I need a :  No  is needed.  What do I need to tell my doctor:  If you think you might be pregnant, tell your doctor.  What should I do after the exam: Drink lots of fluids in the next one to two days. This will help you pass the rest of the barium. Your stool may be white. Take walks if possible. You may take a mild laxative (medicine to loosten your stools), but check with your doctor first. If you don t have a bowel movement within two days, call your doctor.  What is this test: This X-ray exam look at your esophagus. This exam uses barium (a white liquid) to help the X-rays show up more clearly.  Who should I call with questions: If you have any questions, please call the Imaging Department where you will have your exam. Directions, parking instructions, and other information is available on our website, [email or web address].              Future tests that were ordered for you today     Open Future Orders        Priority Expected Expires Ordered    Speech Therapy Referral Routine  10/22/2019 10/22/2018    XR Video Swallow w Esophagram - Order with Speech Therapy Referral Routine 10/22/2018 10/22/2019 10/22/2018            Who to contact     Please call your clinic at [phone] to:    Ask questions about your health    Make or cancel appointments    Discuss your medicines    Learn about your test results    Speak to your doctor            Additional Information About Your Visit        Stumpwise Information     St[employer]wise is an electronic gateway that provides  "easy, online access to your medical records. With [employer], you can request a clinic appointment, read your test results, renew a prescription or communicate with your care team.     To sign up for [employer] visit the website at [email or web address]   You will be asked to enter the access code listed below, as well as some personal information. Please follow the directions to create your username and password.     Your access code is: [email or web address]  Expires: 2019  1:43 PM     Your access code will  in 90 days. If you need help or a new code, please contact your [facility] or call [phone] for assistance.        Care EveryWhere ID     This is your Care EveryWhere ID. This could be used by other organizations to access your [place] medical records  [id number]        Your Vitals Were     Height BMI (Body Mass Index)                1.88 m (6' 2\") 21.96 kg/m2           Blood Pressure from Last 3 Encounters:   12 139/69   12 141/83   12 100/48    Weight from Last 3 Encounters:   10/22/18 77.6 kg (171 lb)   13 93.4 kg (205 lb 12.8 oz)   12 85 kg (187 lb 6.3 oz)               Primary Care Provider Office Phone # Fax #    Alexei Arevalo [phone]       [place] [address] [facility] [address]        Equal Access to Services     ABBI VILLEGAS AH: Hadii aad ku hadasho Soomaali, waaxda luqadaha, qaybta kaalmada adeegyada, waxay idiin natasha obregon . So [facility] [phone].    ATENCIÓN: Si habla nenitaañol, tiene a ledezma disposición servicios gratuitos de asistencia lingüística. Llame al [phone].    We comply with applicable federal civil rights laws and [place] laws. We do not discriminate on the basis of race, color, national origin, age, disability, sex, sexual orientation, or gender identity.            Thank you!     Thank you for choosing [facility]  for your care. Our goal " is always to provide you with excellent care. Hearing back from our patients is one way we can continue to improve our services. Please take a few minutes to complete the written survey that you may receive in the mail after your visit with us. Thank you!             Your Updated Medication List - Protect others around you: Learn how to safely use, store and throw away your medicines at www.disposemymeds.org.          This list is accurate as of 10/22/18 10:14 AM.  Always use your most recent med list.                   Brand Name Dispense Instructions for use Diagnosis    amLODIPine 5 MG tablet    NORVASC     Take 5 mg by mouth daily.        aspirin 81 MG EC tablet      Take 81 mg by mouth        doxycycline 100 MG capsule    VIBRAMYCIN     TAKE 1 CAPSULE BY MOUTH EVERY 12 HOURS FOR 10 DAYS        furosemide 20 MG tablet    LASIX     Take 20 mg by mouth daily.        levothyroxine 50 MCG tablet    SYNTHROID/LEVOTHROID    90 tablet    Take 1 tablet by mouth daily.    Laryngeal cancer (H)       metoprolol tartrate 25 MG tablet    LOPRESSOR     TAKE 1 TABLET BY MOUTH TWICE A DAY        NIFEdipine ER 30 MG Tb24    ADALAT CC     Take 30 mg by mouth        PRILOSEC PO      Take 20 mg by mouth. daily

## 2018-10-22 NOTE — PATIENT INSTRUCTIONS
1. Please schedule a swallow study.   2. Please call the ENT clinic with any questions,concerns, new or worsening symptoms.    -Clinic number is [phone]   - Sophie's direct line (Dr. Archuleta's nurse) [phone]

## 2018-11-01 ENCOUNTER — RADIANT APPOINTMENT (OUTPATIENT)
Dept: GENERAL RADIOLOGY | Facility: CLINIC | Age: 83
End: 2018-11-01
Attending: OTOLARYNGOLOGY
Payer: MEDICARE

## 2018-11-01 ENCOUNTER — THERAPY VISIT (OUTPATIENT)
Dept: SPEECH THERAPY | Facility: CLINIC | Age: 83
End: 2018-11-01
Payer: MEDICARE

## 2018-11-01 DIAGNOSIS — C32.9 LARYNGEAL CANCER (H): Primary | ICD-10-CM

## 2018-11-01 DIAGNOSIS — R13.14 PHARYNGOESOPHAGEAL DYSPHAGIA: ICD-10-CM

## 2018-11-01 DIAGNOSIS — R13.12 OROPHARYNGEAL DYSPHAGIA: ICD-10-CM

## 2018-11-01 DIAGNOSIS — R49.1 APHONIA: ICD-10-CM

## 2018-11-01 RX ORDER — BARIUM SULFATE 400 MG/ML
25 SUSPENSION ORAL ONCE
Status: COMPLETED | OUTPATIENT
Start: 2018-11-01 | End: 2018-11-01

## 2018-11-01 RX ADMIN — BARIUM SULFATE 25 ML: 400 SUSPENSION ORAL at 10:09

## 2018-11-01 NOTE — PROGRESS NOTES
11/01/18 1000   General Information   Type Of Visit Initial   Start Of Care Date 11/01/18   Referring Physician Dr. Patrick Archuleta   Orders Evaluate And Treat   Orders Comment Video Swallow Study   Medical Diagnosis Laryngeal cancer s/p total laryngectomy and radiation.    Onset Of Illness/injury Or Date Of Surgery 08/01/10   Precautions/limitations No Known Precautions/limitations   Pertinent History of Current Problem/OT: Additional Occupational Profile Info Grabiel Ma is an 83-year-old man with a history of laryngeal cancer status post  total laryngectomy.  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  He did not note any aspiration and thinks he comes in quite frequently for his TEP changes.   Prior Level Of Function Swallowing   Prior Level Of Function Comment Regular solids and thin liquids.    Living Environment House/[facility]   Home/community Accessibility Comments (flowsheet Row) with his spouse. His son also lives near by.    VFSS Eval: Radiology   Radiologist Resident   Views Taken left lateral;A/P   Physical Location of Procedure [facility]   VFSS Eval: Thin Liquid Texture Trial   Mode of Presentation, Thin Liquid cup;self-fed;straw   Order of Presentation 1,2,7   Preparatory Phase WFL   Oral Phase, Thin Liquid WFL   Pharyngeal Phase, Thin Liquid WFL   Rosenbek's Penetration Aspiration Scale: Thin Liquid Trial Results 1 - no aspiration, contrast does not enter airway   Diagnostic Statement Patient demonstrates adequate transition of the bolus through the pharynx into the esophagus during video swallow portion.  Patient does demonstrate residual during the esophagram when he is in a reclined position with inability to transition out of his oropharynx.  He also has nasal regurgitation during those same episodes.   VFSS Eval: Nectar Thick Liquid Texture Trial   Mode of Presentation, Nectar cup;self-fed   Order of Presentation 3    Preparatory Phase WFL   Oral Phase, Nectar WFL   Pharyngeal Phase, Lefors WFL   Rosenbek's Penetration Aspiration Scale: Nectar-Thick Liquid Trial Results 1 - no aspiration, contrast does not enter airway   Diagnostic Statement Patient demonstrates adequate transition of bolus through the pharynx and into the esophagus during nectar thick liquid trial.   VFSS Eval: Puree Solid Texture Trial   Mode of Presentation, Puree spoon;self-fed   Order of Presentation 4   Preparatory Phase WFL   Oral Phase, Puree WFL   Pharyngeal Phase, Puree WFL   Rosenbek's Penetration Aspiration Scale: Puree Food Trial Results 1 - no aspiration, contrast does not enter airway   Diagnostic Statement Patient demonstrates adequate transition of bolus through the pharynx and into the esophagus on purée consistency trial.   VFSS Eval: Solid Food Texture Trial   Mode of Presentation, Solid self-fed   Order of Presentation 5   Preparatory Phase WFL   Oral Phase, Solid WFL   Pharyngeal Phase, Solid WFL   Rosenbek's Penetration Aspiration Scale: Solid Food Trial Results 1 - no aspiration, contrast does not enter airway   Diagnostic Statement Patient demonstrates adequate transition of solid consistency through the pharynx into the esophagus.  This does require multiple swallows.   Swallow Compensations   Swallow Compensations No compensations were used   Educational Assessment   Barriers to Learning No barriers   Esophageal Phase of Swallow   Patient reports or presents with symptoms of esophageal dysphagia Yes   Esophageal comments Esophagram completed per radiologist.  Please refer to the report for further details on esophageal function.   Swallow Eval: Clinical Impressions   Skilled Criteria for Therapy Intervention No significant expected  improvement in functional status   Functional Assessment Scale (FAS) 5   Dysphagia Outcome Severity Scale (SUKUMAR) Level 5 - SUKUMAR   Treatment Diagnosis Mild oropharyngeal dysphagia   Diet texture  recommendations Dysphagia diet level 3;Thin liquids   Recommended Feeding/Eating Techniques alternate between small bites and sips of food/liquid;maintain upright posture during/after eating for 30 mins;small sips/bites  (Sit upright for 90 minutes after p.o. intake)   Demonstrates Need for Referral to Another Service other (see comments)  (May benefit from GI consult)   Risks and Benefits of Treatment have been explained. Yes   Patient, family and/or staff in agreement with Plan of Care Yes   Clinical Impression Comments Mr. Ma demonstrates mild oropharyngeal dysphagia is characterized by slightly decreased transit of bolus through the pharynx and into the esophagus.  In the upright position he is able to transition this without difficulties.  During the esophagram, while he was reclined, patient demonstrated significant residual in his neopharynx with nasal regurgitation.  During these reclined portions he demonstrates aspiration through the tracheoesophageal prosthesis likely due to the amount of fluid behind the prosthesis.  He does not demonstrate coughing in response to aspirated material which is quite concerning.  He has had pneumonia several times over the past several months which is likely due to a leaking prosthesis that he is unaware of.  His decrease esophageal motility increases his risk for aspiration quite significantly.  Safe swallow strategies reviewed with patient.  Reviewed the video with the patient for all swallows within scope of practice.  We will also change patient's TEP today to minimize aspiration risk.  He will continue soft solids and thin liquids.  He will sit upright for all p.o. intake.  He will not eat within 3 hours of going to bed.  He may benefit from GI consult although there may only be limited options there and he at this point does not want to require a feeding tube.  See   Total Session Time   Total Session Time 30   Total Evaluation Time 30   SLP Medicare Only G-code    G-code Swallowing   Swallowing   Swallowing:  Current Status , Goal , Discharge -Xudh Only-Modifier the same for all G-codes CJ: 20-39% impairment   Swallowing: Current  & Discharge Modifier Rationale-Eval Only Modifier chosen based on the results of this evaluation and reference to ERIN wild.

## 2018-11-01 NOTE — MR AVS SNAPSHOT
After Visit Summary   2018    Grabiel Ma    MRN: [id number]           Patient Information     Date Of Birth          1935        Visit Information        Provider Department      2018 10:00 AM Erika Obando SLP [facility]        Today's Diagnoses     Laryngeal cancer (H)    -  1    Pharyngoesophageal dysphagia           Follow-ups after your visit        Who to contact     Please call your clinic at [phone] to:    Ask questions about your health    Make or cancel appointments    Discuss your medicines    Learn about your test results    Speak to your doctor            Additional Information About Your Visit        MyChart Information     StudyMax is an electronic gateway that provides easy, online access to your medical records. With StudyMax, you can request a clinic appointment, read your test results, renew a prescription or communicate with your care team.     To sign up for IDverget visit the website at [email or web address]   You will be asked to enter the access code listed below, as well as some personal information. Please follow the directions to create your username and password.     Your access code is: [email or web address]  Expires: 2019  1:43 PM     Your access code will  in 90 days. If you need help or a new code, please contact your [facility] or call [phone] for assistance.        Care EveryWhere ID     This is your Care EveryWhere ID. This could be used by other organizations to access your [place] medical records  [id number]         Blood Pressure from Last 3 Encounters:   12 139/69   12 141/83   12 100/48    Weight from Last 3 Encounters:   10/22/18 77.6 kg (171 lb)   13 93.4 kg (205 lb 12.8 oz)   12 85 kg (187 lb 6.3 oz)              Today, you had the following     No orders found for display       Primary Care Provider Office Phone # Fax #    Alexei Arevalo [phone]  [phone]       [place] [id number] [facility] [address]        Equal Access to Services     ABBI VILLEGAS : Hadii aad ku hadsunnyfany Lottie, waseemada luqstuha, qacecilta kaalmada mary, lucy ashiain hayaasanam salehzay gibbs laAwildajanie henson. So [facility] [phone].    ATENCIÓN: Si habla español, tiene a ledezma disposición servicios gratuitos de asistencia lingüística. Vladimir al [phone].    We comply with applicable federal civil rights laws and [place] laws. We do not discriminate on the basis of race, color, national origin, age, disability, sex, sexual orientation, or gender identity.            Thank you!     Thank you for choosing [facility]  for your care. Our goal is always to provide you with excellent care. Hearing back from our patients is one way we can continue to improve our services. Please take a few minutes to complete the written survey that you may receive in the mail after your visit with us. Thank you!             Your Updated Medication List - Protect others around you: Learn how to safely use, store and throw away your medicines at www.disposemymeds.org.          This list is accurate as of 11/1/18 12:35 PM.  Always use your most recent med list.                   Brand Name Dispense Instructions for use Diagnosis    amLODIPine 5 MG tablet    NORVASC     Take 5 mg by mouth daily.        aspirin 81 MG EC tablet      Take 81 mg by mouth        doxycycline 100 MG capsule    VIBRAMYCIN     TAKE 1 CAPSULE BY MOUTH EVERY 12 HOURS FOR 10 DAYS        furosemide 20 MG tablet    LASIX     Take 20 mg by mouth daily.        levothyroxine 50 MCG tablet    SYNTHROID/LEVOTHROID    90 tablet    Take 1 tablet by mouth daily.    Laryngeal cancer (H)       metoprolol tartrate 25 MG tablet    LOPRESSOR     TAKE 1 TABLET BY MOUTH TWICE A DAY        NIFEdipine ER 30 MG Tb24    ADALAT CC     Take 30 mg by mouth        PRILOSEC PO      Take 20 mg by mouth. daily

## 2018-11-01 NOTE — PROGRESS NOTES
11/01/18 1100   General Patient Information   Start of Care Date 11/01/18   Referring Physician Dr. Patrick Archuleta   Orders Eval and Treat   Orders Comment TEP Eval   Orders Date 11/01/18   Medical Diagnosis Laryngeal cancer s/p total laryngectomy   Onset of illness/injury or Date of Surgery 04/17/12   Precautions/Limitations no known precautions/limitations   Special Instructions TEP assessment   Surgical/Medical history reviewed Yes   Pertinent history of current problem placement 4/17/12. Pt had recurrent laryngeal cancer s/p XRT. Pt completed XRT for lung cancer in summer of 2015. He underwent additional XRT for liver mets ending in late 2016. Pt reports leakage which begain on Sunday. He was not able to get in until today. He has had pneumonia several times since his last TEP change with multiple courses of antibiotics.  During his video swallow today he demonstrated aspiration through his TEP due to significant amounts of residual liquid in the pharynx and new esophagus.  He is at high risk for continued aspiration.  He does not demonstrate coughing in response to aspiration which is very dangerous for him and will result in frequent pneumonias.  His prosthesis requires changing due to the leakage.   Hearing WNL for conversational level of speech   Vision Glasses/contacts   Dentition present   Literacy Skills WFL   Present Hydration/Nutrition Method Oral diet   Diet regular with thin liquids   Occupation retired   Living Status house in [place]   Primary Communication Method TEP   Previous Therapy TEP assessments   Evaluation Results   Behavioral Observations Mr. aM was pleasant and cooperative for evaluation.   Communication Observations Mr. Ma is communicating without difficulty with the use of his TEP and HME.   Postoperative Assessment   Postoperative Assessment Tracheoesophageal Voice Prosthesis;Insertion/Replacement   Tracheoesophageal Voice Prosthesis   Tracheoesophageal Voice Prosthesis  Current Prosthesis   Current Prosthesis Intact    Brand Inhealth   Type Indwelling   Length 6mm   Diameter 20 French   Date Of Placement 10/04/18   Examination Of Current Prosthesis Intact in tract. Leaking through central valve   Voicing Function baseline   Swallowing Function Functional   Antifungal Strategies nystatin   Reason For Replacement Valve Failure   Insertion/replacement   Insertion/replacement Given The Above Observations, The Replacement Tracheoesophageal Voice Prosthesis Was Prepared For Insertion According To The  s Instructions.   Brand Inhealth   Type Indwelling;Large Esophageal Flange  (large tracheal flange)   Length 6mm   Diameter 20 French   Insertion Complications None   Voicing Function Following Replacement baseline   Swallowing Function Following Replacement baseline   Hme Brand hi flow; atos   Impressions And Recommendations   Overall Fas Score/level Of Impairment 7   Communication Diagnosis Functional TE voicing   Clinical Impressions   Clinical Impressions Mr. Ma return to functional voicing and swallowing following TEP replacement.  Opted for a large tracheal large esophageal 6 mm, 20 French and health prosthesis to minimize risk of periprosthetic leakage.  Trained patient on visually assessing his prosthesis every day as he does not cough in response to aspiration.  He was also trained to place his plug in the TEP valve shaft as to minimize risk for leakage during mealtime.  Patient may benefit from more frequent TEP replacement.  He also may require a dual valve in order to stop leakage from his valve.  Likely due to his esophageal residue he may have increased esophageal pressure which results in valvular opening.  He also may be a candidate for an active valve if this continued aspiration occurs.  Patient will follow-up as needed and will monitor valves are opening daily to ensure timely replacement.   Equipment   Equipment [facility] 20 Fr 6mm, large esophgeal and  large tracheal flanges   Plan   Plan for next session Follow up PRN for changes in voicing, swallowing or leakage noted through or around prosthesis.   Education   Learner Patient   Readiness Eager   Method Explanation;Demonstration   Response Demonstrates understanding   Therapy Certification   Medical Diagnosis Laryngeal CA   Other SLP Functional Limitation   Other SLP Functional Limitation: Current Status , Goal , Discharge -Jlza Only-Modifier the same for all G-codes CM: 80-99% impairment   Other SLP Functional Limitation: Current & Discharge Modifier Rationale-Eval Only Modifier chosen based on the results of this evaluation, clinical judgement and reference to ERIN guidelines. Pt goal and discharge status are CI: 1-19% impairment   Other SLP Functional Limitation Comments Deficits resolved.    Total Evaluation Time   Total Evaluation Time 30

## 2018-11-01 NOTE — MR AVS SNAPSHOT
After Visit Summary   2018    Grabiel Ma    MRN: [id number]           Patient Information     Date Of Birth          1935        Visit Information        Provider Department      2018 11:00 AM Erika Obando SLP [facility]        Today's Diagnoses     Laryngeal cancer (H)    -  1    [place]           Follow-ups after your visit        Additional Services     SPEECH THERAPY REFERRAL       If you have not heard from the scheduling office within 2 business days, please call [phone] for all locations, with the exception of [place], please call [phone] and [place], please call [phone].    Please be aware that coverage of these services is subject to the terms and limitations of your health insurance plan.  Call member services at your health plan with any benefit or coverage questions.                  Future tests that were ordered for you today     Open Future Orders        Priority Expected Expires Ordered    SPEECH THERAPY REFERRAL Routine  2019            Who to contact     Please call your clinic at [phone] to:    Ask questions about your health    Make or cancel appointments    Discuss your medicines    Learn about your test results    Speak to your doctor            Additional Information About Your Visit        MyChart Information     WealthForge is an electronic gateway that provides easy, online access to your medical records. With WealthForge, you can request a clinic appointment, read your test results, renew a prescription or communicate with your care team.     To sign up for WealthForge visit the website at [email or web address]   You will be asked to enter the access code listed below, as well as some personal information. Please follow the directions to create your username and password.     Your access code is: [email or web address]  Expires: 2019  1:43 PM     Your access code will  in 90 days. If you need help or a new code, please  contact your [facility] or call [phone] for assistance.        Care EveryWhere ID     This is your Care EveryWhere ID. This could be used by other organizations to access your [place] medical records  [id number]         Blood Pressure from Last 3 Encounters:   04/17/12 139/69   03/13/12 141/83   03/06/12 100/48    Weight from Last 3 Encounters:   10/22/18 77.6 kg (171 lb)   03/25/13 93.4 kg (205 lb 12.8 oz)   04/17/12 85 kg (187 lb 6.3 oz)               Primary Care Provider Office Phone # Fax #    Alexei Arevalo [phone]       [place] [id number] [facility] [address]        Equal Access to Services     ABBI VILLEGAS : Hadii aad ku hadasho Soomaali, waaxda luqadaha, qaybta kaalmada adeegyada, waxay ashiain hayaan adezay henson. So [facility] [phone].    ATENCIÓN: Si habla español, tiene a ledezma disposición servicios gratuitos de asistencia lingüística. Vladimir al [phone].    We comply with applicable federal civil rights laws and [place] laws. We do not discriminate on the basis of race, color, national origin, age, disability, sex, sexual orientation, or gender identity.            Thank you!     Thank you for choosing [facility]  for your care. Our goal is always to provide you with excellent care. Hearing back from our patients is one way we can continue to improve our services. Please take a few minutes to complete the written survey that you may receive in the mail after your visit with us. Thank you!             Your Updated Medication List - Protect others around you: Learn how to safely use, store and throw away your medicines at www.disposemymeds.org.          This list is accurate as of 11/1/18 12:50 PM.  Always use your most recent med list.                   Brand Name Dispense Instructions for use Diagnosis    amLODIPine 5 MG tablet    NORVASC     Take 5 mg by mouth daily.        aspirin 81 MG EC tablet      Take  81 mg by mouth        doxycycline 100 MG capsule    VIBRAMYCIN     TAKE 1 CAPSULE BY MOUTH EVERY 12 HOURS FOR 10 DAYS        furosemide 20 MG tablet    LASIX     Take 20 mg by mouth daily.        levothyroxine 50 MCG tablet    SYNTHROID/LEVOTHROID    90 tablet    Take 1 tablet by mouth daily.    Laryngeal cancer (H)       metoprolol tartrate 25 MG tablet    LOPRESSOR     TAKE 1 TABLET BY MOUTH TWICE A DAY        NIFEdipine ER 30 MG Tb24    ADALAT CC     Take 30 mg by mouth        PRILOSEC PO      Take 20 mg by mouth. daily

## 2018-11-28 ENCOUNTER — TRANSFERRED RECORDS (OUTPATIENT)
Dept: HEALTH INFORMATION MANAGEMENT | Facility: CLINIC | Age: 83
End: 2018-11-28

## 2019-01-04 ENCOUNTER — OFFICE VISIT (OUTPATIENT)
Dept: SPEECH THERAPY | Facility: CLINIC | Age: 84
End: 2019-01-04
Payer: MEDICARE

## 2019-01-04 DIAGNOSIS — R49.1 APHONIA: ICD-10-CM

## 2019-01-04 DIAGNOSIS — C32.1 MALIGNANT NEOPLASM OF SUPRAGLOTTIS (H): Primary | ICD-10-CM

## 2019-01-04 NOTE — PROGRESS NOTES
01/04/19 1300   General Patient Information   Start of Care Date 01/04/19   Referring Physician Dr. Patrick Archuleta   Orders Eval and Treat   Orders Comment TEP Eval   Orders Date 01/04/19   Medical Diagnosis Laryngeal cancer s/p total laryngectomy   Onset of illness/injury or Date of Surgery 04/17/12   Precautions/Limitations no known precautions/limitations   Special Instructions TEP assessment   Surgical/Medical history reviewed Yes   Pertinent history of current problem PT's PMH is significant for total laryngectomy on 2/28/12 with TEP placement 4/17/12. Pt had recurrent laryngeal cancer s/p XRT. Pt completed XRT for lung cancer in summer of 2015. He underwent additional XRT for liver mets ending in late 2016. Pt presents today for scheduled change due to multiple recurrent pneumonia as he does not sense his TEP leakage and aspiration. He does not report any recent leakage but due to his history of not sensing the leakage/aspiration this is not reliable.    Hearing WNL for conversational level of speech   Vision Glasses/contacts   Dentition present   Literacy Skills WFL   Present Hydration/Nutrition Method Oral diet   Diet regular with thin liquids  (Pt has esophgeal motility issues)   Occupation retired   Living Status house in [place]   Primary Communication Method TEP   Previous Therapy TEP assessments   Evaluation Results   Behavioral Observations Mr. Ma was pleasant and cooperative for evaluation.   Communication Observations Mr. Ma is communicating without difficulty with the use of his TEP and HME.   Postoperative Assessment   Postoperative Assessment Tracheoesophageal Voice Prosthesis;Insertion/Replacement   Tracheoesophageal Voice Prosthesis   Tracheoesophageal Voice Prosthesis Current Prosthesis   Current Prosthesis Intact    Brand Inhealth   Type Indwelling   Length 6mm   Diameter 20 [place]   Date Of Placement 11/01/18   Examination Of Current Prosthesis Intact in tract. Moderately severe  amount of biofilm noted on the esophgeal flange after removal.   Voicing Function baseline   Swallowing Function Functional   Antifungal Strategies nystatin   Reason For Replacement Valve Failure   Insertion/replacement   Insertion/replacement Given The Above Observations, The Replacement Tracheoesophageal Voice Prosthesis Was Prepared For Insertion According To The  s Instructions.   Brand Inhealth   Type Indwelling;Large Esophageal Flange  (large tracheal flange)   Length 6mm   Diameter 20 Malaysian   Insertion Complications Reflux of esophgeal contents into the trachea after prosthesis was removed.    Voicing Function Following Replacement baseline   Swallowing Function Following Replacement baseline   Hme Brand hi flow; atos   Impressions And Recommendations   Overall Fas Score/level Of Impairment 7   Communication Diagnosis Functional TE voicing   Clinical Impressions   Clinical Impressions Mr. Ma returned to functional voicing and swallowing following TEP replacement.  [employer] large tracheal and large esophageal flange 6 mm, 20 Malaysian prosthesis. Due to pt's history of esophageal dismotility, pt will benefit from large flange prosthesis.  Patient will benefit from more frequent TEP replacement.  Pt did demonstrate reflux of liquid from his esophagus through his TE tract into his lungs while the prosthesis was out. Suction required clearing some of this material. Pt will follow up within 2 months for replacement due to high risk for aspiration and decreased sensitivity.   Equipment   Equipment [facility] 20 Fr 6mm, large esophgeal and large tracheal flanges   Plan   Plan for next session Follow up within two months for changes in voicing, swallowing or leakage noted through or around prosthesis.   Education   Learner Patient   Readiness Eager   Method Explanation;Demonstration   Response Demonstrates understanding   Therapy Certification   Medical Diagnosis Laryngeal CA   Total Evaluation Time    Total Evaluation Time 35     Thank you for the referral of Grabiel Ma. If you have any questions about this report, please contact me using the information below.      Erika Obando MS, CCC-SLP  Speech-Language Pathology  [facility] [place]  Departement of Otolaryngology/D&T - 4th floor  Pager: [phone]  Phone: [phone]  Email: [email or web address]

## 2019-01-09 ENCOUNTER — TRANSFERRED RECORDS (OUTPATIENT)
Dept: HEALTH INFORMATION MANAGEMENT | Facility: CLINIC | Age: 84
End: 2019-01-09